# Patient Record
Sex: FEMALE | Race: WHITE | Employment: OTHER | URBAN - METROPOLITAN AREA
[De-identification: names, ages, dates, MRNs, and addresses within clinical notes are randomized per-mention and may not be internally consistent; named-entity substitution may affect disease eponyms.]

---

## 2017-09-16 ENCOUNTER — APPOINTMENT (OUTPATIENT)
Dept: GENERAL RADIOLOGY | Age: 67
End: 2017-09-16
Attending: EMERGENCY MEDICINE
Payer: MEDICARE

## 2017-09-16 ENCOUNTER — HOSPITAL ENCOUNTER (EMERGENCY)
Age: 67
Discharge: HOME | End: 2017-09-16
Attending: EMERGENCY MEDICINE | Admitting: EMERGENCY MEDICINE
Payer: MEDICARE

## 2017-09-16 VITALS
OXYGEN SATURATION: 98 % | TEMPERATURE: 98.4 F | WEIGHT: 138.6 LBS | RESPIRATION RATE: 16 BRPM | SYSTOLIC BLOOD PRESSURE: 140 MMHG | DIASTOLIC BLOOD PRESSURE: 74 MMHG | HEIGHT: 66 IN | BODY MASS INDEX: 22.28 KG/M2 | HEART RATE: 70 BPM

## 2017-09-16 DIAGNOSIS — S60.229S: ICD-10-CM

## 2017-09-16 DIAGNOSIS — S40.021A CONTUSION OF ARM, RIGHT, INITIAL ENCOUNTER: ICD-10-CM

## 2017-09-16 DIAGNOSIS — S00.03XA CONTUSION OF SCALP, INITIAL ENCOUNTER: Primary | ICD-10-CM

## 2017-09-16 DIAGNOSIS — S80.01XA CONTUSION OF RIGHT KNEE, INITIAL ENCOUNTER: ICD-10-CM

## 2017-09-16 DIAGNOSIS — S80.02XA CONTUSION OF LEFT KNEE, INITIAL ENCOUNTER: ICD-10-CM

## 2017-09-16 PROCEDURE — L3670 SO ACRO/CLAV CAN WEB PRE OTS: HCPCS

## 2017-09-16 PROCEDURE — 73090 X-RAY EXAM OF FOREARM: CPT

## 2017-09-16 PROCEDURE — 99283 EMERGENCY DEPT VISIT LOW MDM: CPT

## 2017-09-16 NOTE — ED TRIAGE NOTES
Patient c/o trip and fall in backyard ~45 minutes ago. Patient c/o RIGHT arm pain and abrasions to both knees and palms. Patient hit head when fell.  No LOC

## 2017-09-16 NOTE — DISCHARGE INSTRUCTIONS
Head Injury: Care Instructions  Your Care Instructions  Most injuries to the head are minor. Bumps, cuts, and scrapes on the head and face usually heal well and can be treated the same as injuries to other parts of the body. Although it's rare, once in a while a more serious problem shows up after you are home. So it's good to be on the lookout for symptoms for a day or two. Follow-up care is a key part of your treatment and safety. Be sure to make and go to all appointments, and call your doctor if you are having problems. It's also a good idea to know your test results and keep a list of the medicines you take. How can you care for yourself at home? · Follow your doctor's instructions. He or she will tell you if you need someone to watch you closely for the next 24 hours or longer. · Take it easy for the next few days or more if you are not feeling well. · Ask your doctor when it's okay for you to go back to activities like driving a car, riding a bike, or operating machinery. When should you call for help? Call 911 anytime you think you may need emergency care. For example, call if:  · You have a seizure. · You passed out (lost consciousness). · You are confused or can't stay awake. Call your doctor now or seek immediate medical care if:  · You have new or worse vomiting. · You feel less alert. · You have new weakness or numbness in any part of your body. Watch closely for changes in your health, and be sure to contact your doctor if:  · You do not get better as expected. · You have new symptoms, such as headaches, trouble concentrating, or changes in mood. Where can you learn more? Go to http://susan-cole.info/. Enter N785 in the search box to learn more about \"Head Injury: Care Instructions. \"  Current as of: October 14, 2016  Content Version: 11.3  © 2115-1447 [employer].  Care instructions adapted under license by [employer] (which disclaims liability or warranty for this information). If you have questions about a medical condition or this instruction, always ask your healthcare professional. [address] any warranty or liability for your use of this information. Learning About RICE (Rest, Ice, Compression, and Elevation)  What is RICE? RICE is a way to care for an injury. RICE helps relieve pain and swelling. It may also help with healing and flexibility. RICE stands for:  · Rest and protect the injured or sore area. · Ice or a cold pack used as soon as possible. · Compression, or wrapping the injured or sore area with an elastic bandage. · Elevation (propping up) the injured or sore area. How do you do RICE? You can use RICE for home treatment when you have general aches and pains or after an injury or surgery. Rest  · Do not put weight on the injury for at least 24 to 48 hours. · Use crutches for a badly sprained knee or ankle. · Support a sprained wrist, elbow, or shoulder with a sling. Ice  · Put ice or a cold pack on the injury right away to reduce pain and swelling. Frozen vegetables will also work as an ice pack. Put a thin cloth between the ice or cold pack and your skin. The cloth protects the injured area from getting too cold. · Use ice for 10 to 15 minutes at a time for the first 48 to 72 hours. Compression  · Use compression for sprains, strains, and surgeries of the arms and legs. · Wrap the injured area with an elastic bandage or compression sleeve to reduce swelling. · Don't wrap it too tightly. If the area below it feels numb, tingles, or feels cool, loosen the wrap. Elevation  · Use elevation for areas of the body that can be propped up, such as arms and legs. · Prop up the injured area on pillows whenever you use ice. Keep it propped up anytime you sit or lie down. · Try to keep the injured area at or above the level of your heart. This will help reduce swelling and bruising.   Where can you learn more? Go to http://susan-cole.info/. Enter T378 in the search box to learn more about \"Learning About RICE (Rest, Ice, Compression, and Elevation). \"  Current as of: March 21, 2017  Content Version: 11.3  © 9679-2028 [employer]. Care instructions adapted under license by [employer] (which disclaims liability or warranty for this information). If you have questions about a medical condition or this instruction, always ask your healthcare professional. [place] [address] any warranty or liability for your use of this information. Using a Sling: Care Instructions  Your Care Instructions  A sling supports your forearm. It keeps an injured arm or shoulder from moving. Some slings, called immobilizers, have a strap that goes around your waist to hold your arm against your body. Your doctor may have given you a custom sling that holds your arm in a certain position. If not, you can use a ready-made sling from a drugstore. But a sling can create problems. Keeping your arm in one position for too long can cause serious problems, such as frozen shoulder. Follow-up care is a key part of your treatment and safety. Be sure to make and go to all appointments, and call your doctor if you are having problems. It's also a good idea to know your test results and keep a list of the medicines you take. How can you care for yourself at home? · To put a sling on by yourself without using your shoulder, place the sling on a table. Lower your forearm into the sling pocket. Then secure the strap(s). · If you are able, you can first strap the sling over your shoulder, then slip your forearm into the sling pocket. · Make sure that the sling allows your arm and shoulder to relax. · Follow your doctor's instructions for how often to:  ¨ Take the sling off. ¨ Do exercises to prevent problems such as frozen shoulder.   · If the fingers of the arm in the sling were not injured, wiggle them every now and then. This helps move the blood and fluids in the injured arm. · Keep up your muscle strength and tone as much as you can while protecting your injured arm or shoulder. Your doctor may want you to tense and relax the muscles protected by the sling, but only if it's not painful. Check with your doctor or your physical or occupational therapist for instructions. · Use the sling until your doctor tells you that you no longer need it. How long you wear a sling depends on your diagnosis and how you heal.  When should you call for help? Call your doctor now or seek immediate medical care if:  · Your pain gets a lot worse. · You cannot move your arm. · You have tingling, weakness, or numbness in your hand or arm. · Your arm or hand turns cold or changes color. · Your sling feels too tight, and you cannot loosen it. Watch closely for changes in your health, and be sure to contact your doctor if:  · You have new or worse swelling in your arm. · You have new pain that develops in another area of your arm. · You do not get better as expected. Where can you learn more? Go to http://susan-cole.info/. Enter [phone] in the search box to learn more about \"Using a Sling: Care Instructions. \"  Current as of: March 21, 2017  Content Version: 11.3  © 6878-5187 [email or web address]. Care instructions adapted under license by [employer] (which disclaims liability or warranty for this information). If you have questions about a medical condition or this instruction, always ask your healthcare professional. [place] [address] any warranty or liability for your use of this information.

## 2017-09-16 NOTE — ED PROVIDER NOTES
HPI Comments: 75 y.o. female with past medical history significant for Anxiety and Fibromyalgia who presents from Home with chief complaint of Right Arm Pain after Fall. Patient states \"45 minutes ago\" she was in his backyard when she \"tripped and fell\" falling forward onto her bilateral hands and knees hitting the right side of her face. Pt denies LOC. Pt reports immediate onset of right arm pain. Pt states constant moderate right arm pain with no radiation. Pt states aggravation with applied pressure and movement. Pt states accompanying bilateral knee tenderness with accompanying small abrasions. Pt denies current use of blood thinners. Pt denies fever, chills, cough, congestion, SOB, chest pain, abdominal pain, nausea, vomiting, diarrhea, difficulty urinating, or dysuria. Pt denies any other acute medical complaints. There are no other acute medical concerns at this time. PCP: Rickey Carrillo MD    Note written by Ester Perez, as dictated by Kory Baker MD 3:34 PM    The history is provided by the patient. Past Medical History:   Diagnosis Date    Anxiety     Fibromyalgia     Lymphoma ([address]        Past Surgical History:   Procedure Laterality Date    HX HYSTERECTOMY      HX TONSILLECTOMY           History reviewed. No pertinent family history. Social History     Social History    Marital status:      Spouse name: N/A    Number of children: N/A    Years of education: N/A     Occupational History    Not on file. Social History Main Topics    Smoking status: Never Smoker    Smokeless tobacco: Never Used    Alcohol use No    Drug use: Not on file    Sexual activity: Not on file     Other Topics Concern    Not on file     Social History Narrative    No narrative on file         ALLERGIES: Adhesive tape-silicones    Review of Systems   Constitutional: Negative for chills and fever. HENT: Negative for congestion.     Respiratory: Negative for cough and shortness of breath. Cardiovascular: Negative for chest pain. Gastrointestinal: Negative for abdominal pain, diarrhea, nausea and vomiting. Genitourinary: Negative for difficulty urinating and dysuria. Musculoskeletal: Positive for arthralgias. Neurological: Negative for syncope. All other systems reviewed and are negative. Vitals:    09/16/17 1432   BP: 142/81   Pulse: 82   Resp: 18   Temp: 98.6 °F (37 °C)   SpO2: 97%   Weight: 62.9 kg (138 lb 9.6 oz)   Height: 5' 6\" (1.676 m)            Physical Exam   Constitutional: She is oriented to person, place, and time. She appears well-developed and well-nourished. No distress. HENT:   Head: Normocephalic and atraumatic. Nose: Nose normal.   Mouth/Throat: Oropharynx is clear and moist.   No neck tenderness, no head contusion    Eyes: Conjunctivae and EOM are normal. Pupils are equal, round, and reactive to light. No scleral icterus. Neck: Normal range of motion. Neck supple. No JVD present. No tracheal deviation present. No thyromegaly present. No carotid bruits noted. Cardiovascular: Normal rate, regular rhythm, normal heart sounds and intact distal pulses. Exam reveals no gallop and no friction rub. No murmur heard. Pulmonary/Chest: Effort normal and breath sounds normal. No respiratory distress. She has no wheezes. She has no rales. She exhibits no tenderness. Abdominal: Soft. Bowel sounds are normal. She exhibits no distension and no mass. There is no tenderness. There is no rebound and no guarding. Musculoskeletal: Normal range of motion. She exhibits edema. She exhibits no tenderness. Slight edema over patella, no pain with stressing the tendon. Lymphadenopathy:     She has no cervical adenopathy. Neurological: She is alert and oriented to person, place, and time. She has normal reflexes. No cranial nerve deficit. Coordination normal.   Skin: Skin is warm and dry. No rash noted. No erythema.    Small abrasions over bilateral knees, no active bleeding   Psychiatric: She has a normal mood and affect. Her behavior is normal. Judgment and thought content normal.   Nursing note and vitals reviewed. Note written by Ester Burks, as dictated by Doyne Heimlich, MD 3:34 PM      MDM  Number of Diagnoses or Management Options  Contusion of arm, right, initial encounter: new and requires workup  Contusion of hand, unspecified laterality, sequela: new and requires workup  Contusion of left knee, initial encounter: new and requires workup  Contusion of right knee, initial encounter: new and requires workup  Contusion of scalp, initial encounter: new and requires workup     Amount and/or Complexity of Data Reviewed  Tests in the radiology section of CPT®: ordered and reviewed  Decide to obtain previous medical records or to obtain history from someone other than the patient: yes  Independent visualization of images, tracings, or specimens: yes    Risk of Complications, Morbidity, and/or Mortality  Presenting problems: moderate  Diagnostic procedures: moderate  Management options: moderate    Patient Progress  Patient progress: stable    ED Course       Procedures    PROGRESS NOTE:3:35 PM  Right arm Xray is normal, showing no acute abnormality. Pt denies wanting Tetanus shot    PROGRESS NOTE:3:38 PM  Will put patient into sling, give something for pain, and have patient follow up with PCP    Patient asked about Head CT. Upon exam patient had no bruising, no swelling, no LOC, no nausea, no dizziness, no visual symptoms, no focal neuro deficits, no blood thinners. Discussed these results with patient, who stated that she did not want a CT unless absolutely necessary. Patient has been given head injury instructions and advised to return to ED for any worsening symptoms.

## 2021-06-01 ENCOUNTER — HOSPITAL ENCOUNTER (OUTPATIENT)
Dept: GENERAL RADIOLOGY | Age: 71
Discharge: HOME | End: 2021-06-01
Attending: FAMILY MEDICINE
Payer: MEDICARE

## 2021-06-01 ENCOUNTER — TRANSCRIBE ORDER (OUTPATIENT)
Dept: GENERAL RADIOLOGY | Age: 71
End: 2021-06-01

## 2021-06-01 DIAGNOSIS — M54.10 RADICULOPATHY OF ARM: ICD-10-CM

## 2021-06-01 DIAGNOSIS — M54.10 RADICULOPATHY OF ARM: Primary | ICD-10-CM

## 2021-06-01 PROCEDURE — 72052 X-RAY EXAM NECK SPINE 6/>VWS: CPT

## 2023-05-11 RX ORDER — BUTALBITAL, ACETAMINOPHEN AND CAFFEINE 50; 325; 40 MG/1; MG/1; MG/1
1 TABLET ORAL
COMMUNITY

## 2023-05-11 RX ORDER — CYCLOBENZAPRINE HCL 10 MG
TABLET ORAL 3 TIMES DAILY PRN
COMMUNITY

## 2023-05-11 RX ORDER — COVID-19 ANTIGEN TEST
KIT MISCELLANEOUS
COMMUNITY

## 2023-05-11 RX ORDER — TRIAMTERENE AND HYDROCHLOROTHIAZIDE 37.5; 25 MG/1; MG/1
1 CAPSULE ORAL
COMMUNITY

## 2023-05-11 RX ORDER — TRAMADOL HYDROCHLORIDE 50 MG/1
TABLET ORAL EVERY 6 HOURS PRN
COMMUNITY